# Patient Record
Sex: OTHER/UNKNOWN | Race: WHITE | URBAN - METROPOLITAN AREA
[De-identification: names, ages, dates, MRNs, and addresses within clinical notes are randomized per-mention and may not be internally consistent; named-entity substitution may affect disease eponyms.]

---

## 2018-02-09 ENCOUNTER — APPOINTMENT (OUTPATIENT)
Dept: URBAN - METROPOLITAN AREA CLINIC 290 | Age: 48
Setting detail: DERMATOLOGY
End: 2018-02-26

## 2018-02-09 DIAGNOSIS — Z41.9 ENCOUNTER FOR PROCEDURE FOR PURPOSES OTHER THAN REMEDYING HEALTH STATE, UNSPECIFIED: ICD-10-CM

## 2018-02-09 PROCEDURE — OTHER OTHER (COSMETIC): OTHER

## 2018-02-09 PROCEDURE — OTHER COSMETIC CONSULTATION: FILLERS: OTHER

## 2018-02-09 PROCEDURE — OTHER BOTOX (U OR CC): OTHER

## 2018-02-09 NOTE — PROCEDURE: BOTOX (U OR CC)
Additional Area 4 Location: [facility] Additional Area 4 Location: [facility]

## 2018-02-09 NOTE — PROCEDURE: OTHER (COSMETIC)
Other (Free Text): Recommended 2 1/2 syringes for crowsfeet, glabella, and forehead
Detail Level: Detailed

## 2018-02-12 ENCOUNTER — APPOINTMENT (OUTPATIENT)
Dept: URBAN - METROPOLITAN AREA CLINIC 290 | Age: 48
Setting detail: DERMATOLOGY
End: 2018-02-26

## 2018-02-12 DIAGNOSIS — Z41.9 ENCOUNTER FOR PROCEDURE FOR PURPOSES OTHER THAN REMEDYING HEALTH STATE, UNSPECIFIED: ICD-10-CM

## 2018-02-12 PROCEDURE — OTHER OTHER (COSMETIC): OTHER

## 2018-02-12 PROCEDURE — OTHER FILLERS: OTHER

## 2018-02-12 NOTE — PROCEDURE: OTHER (COSMETIC)
Other (Free Text): 2 vials used today\\nRecommended about 6 vials of sculptra for desired improvements
Detail Level: Detailed